# Patient Record
Sex: MALE | Race: WHITE
[De-identification: names, ages, dates, MRNs, and addresses within clinical notes are randomized per-mention and may not be internally consistent; named-entity substitution may affect disease eponyms.]

---

## 2021-01-07 ENCOUNTER — HOSPITAL ENCOUNTER (OUTPATIENT)
Dept: HOSPITAL 35 - LAB | Age: 76
End: 2021-01-07
Attending: NURSE PRACTITIONER
Payer: COMMERCIAL

## 2021-01-07 DIAGNOSIS — U07.1: Primary | ICD-10-CM

## 2021-01-15 ENCOUNTER — HOSPITAL ENCOUNTER (INPATIENT)
Dept: HOSPITAL 35 - ER | Age: 76
LOS: 4 days | Discharge: HOME HEALTH | DRG: 177 | End: 2021-01-19
Attending: FAMILY MEDICINE | Admitting: FAMILY MEDICINE
Payer: COMMERCIAL

## 2021-01-15 VITALS — HEIGHT: 70 IN | WEIGHT: 226 LBS | BODY MASS INDEX: 32.35 KG/M2

## 2021-01-15 VITALS — DIASTOLIC BLOOD PRESSURE: 47 MMHG | SYSTOLIC BLOOD PRESSURE: 103 MMHG

## 2021-01-15 VITALS — SYSTOLIC BLOOD PRESSURE: 113 MMHG | DIASTOLIC BLOOD PRESSURE: 58 MMHG

## 2021-01-15 DIAGNOSIS — F41.9: ICD-10-CM

## 2021-01-15 DIAGNOSIS — U07.1: Primary | ICD-10-CM

## 2021-01-15 DIAGNOSIS — G89.29: ICD-10-CM

## 2021-01-15 DIAGNOSIS — Z90.49: ICD-10-CM

## 2021-01-15 DIAGNOSIS — K59.00: ICD-10-CM

## 2021-01-15 DIAGNOSIS — J12.82: ICD-10-CM

## 2021-01-15 DIAGNOSIS — J96.01: ICD-10-CM

## 2021-01-15 DIAGNOSIS — M54.9: ICD-10-CM

## 2021-01-15 DIAGNOSIS — Z79.899: ICD-10-CM

## 2021-01-15 DIAGNOSIS — F32.9: ICD-10-CM

## 2021-01-15 LAB
ALBUMIN SERPL-MCNC: 2.6 G/DL (ref 3.4–5)
ALT SERPL-CCNC: 30 U/L (ref 16–63)
ANION GAP SERPL CALC-SCNC: 10 MMOL/L (ref 7–16)
AST SERPL-CCNC: 30 U/L (ref 15–37)
BASOPHILS NFR BLD AUTO: 0.7 % (ref 0–2)
BILIRUB DIRECT SERPL-MCNC: 0.3 MG/DL
BILIRUB SERPL-MCNC: 1 MG/DL (ref 0.2–1)
BUN SERPL-MCNC: 14 MG/DL (ref 7–18)
CALCIUM SERPL-MCNC: 8.8 MG/DL (ref 8.5–10.1)
CHLORIDE SERPL-SCNC: 100 MMOL/L (ref 98–107)
CO2 SERPL-SCNC: 26 MMOL/L (ref 21–32)
CREAT SERPL-MCNC: 1.4 MG/DL (ref 0.7–1.3)
EOSINOPHIL NFR BLD: 1 % (ref 0–3)
ERYTHROCYTE [DISTWIDTH] IN BLOOD BY AUTOMATED COUNT: 13.6 % (ref 10.5–14.5)
GLUCOSE SERPL-MCNC: 111 MG/DL (ref 74–106)
GRANULOCYTES NFR BLD MANUAL: 75.6 % (ref 36–66)
HCT VFR BLD CALC: 40.1 % (ref 42–52)
HGB BLD-MCNC: 13.5 GM/DL (ref 14–18)
LYMPHOCYTES NFR BLD AUTO: 12.3 % (ref 24–44)
MCH RBC QN AUTO: 29 PG (ref 26–34)
MCHC RBC AUTO-ENTMCNC: 33.7 G/DL (ref 28–37)
MCV RBC: 86.2 FL (ref 80–100)
MONOCYTES NFR BLD: 10.4 % (ref 1–8)
NEUTROPHILS # BLD: 6.8 THOU/UL (ref 1.4–8.2)
PLATELET # BLD: 373 THOU/UL (ref 150–400)
POTASSIUM SERPL-SCNC: 3.4 MMOL/L (ref 3.5–5.1)
PROT SERPL-MCNC: 7.2 G/DL (ref 6.4–8.2)
RBC # BLD AUTO: 4.65 MIL/UL (ref 4.5–6)
SODIUM SERPL-SCNC: 136 MMOL/L (ref 136–145)
WBC # BLD AUTO: 9 THOU/UL (ref 4–11)

## 2021-01-15 PROCEDURE — 10879: CPT

## 2021-01-15 PROCEDURE — XW033E5 INTRODUCTION OF REMDESIVIR ANTI-INFECTIVE INTO PERIPHERAL VEIN, PERCUTANEOUS APPROACH, NEW TECHNOLOGY GROUP 5: ICD-10-PCS | Performed by: FAMILY MEDICINE

## 2021-01-15 NOTE — NUR
75 year old male presented to the ED c/o feeling weak and diffuse myalgias
across his back since 1/5/21.  He then got tested for COVID on 1/7/21, which
resulted positive.  He's been monitoring his pulse ox at home, which has been
dipping down to about 89% for the past week.  Today, he noticed it dropping
into 70's.  The patient also notes a fever at 101.0.  The patient has been
admitted to Dr. Quesada on [facility] tele and started in the ED on Zithromax and IV
fluid. Per ED assessment patient is A & O x4.   Patients neida Giraldo has been
notified via ED MD of admission.
 
Enzo Pires [phone] per ED is listed as DPOA.  Noted and called to
Registration to update.  Called and spoke to son and explained role of CM.
Son voiced an understanding.  CM to follow for discharge needs.

## 2021-01-15 NOTE — EKG
[address]  [address]
Phone:  [phone]                    ELECTROCARDIOGRAM REPORT      
_______________________________________________________________________________
 
Name:       ALLEY KEYS             Room #:                     [facility]  
M.RaSulo#:      [id number]     Account #:      [id number]  
Admission:  01/15/21    Attend Phys:                          
Discharge:              Date of Birth:  [birth year]  
                                                          Report #: [id number]
   [id number]
_______________________________________________________________________________
                         [facility] ED
                                       
Test Date:    2021-01-15               Test Time:    12:30:11
Pat Name:     ALLEY KEYS          Department:   
Patient ID:   [id number]            Room:          
Gender:       M                        Technician:   kf
:          1945               Requested By: Chery Billy
Order Number: [id number] MD:   Lyndon Nicole
                                 Measurements
Intervals                              Axis          
Rate:         67                       P:            16
VA:           180                      QRS:          18
QRSD:         119                      T:            23
QT:           427                                    
QTc:          451                                    
                           Interpretive Statements
Sinus rhythm
Nonspecific intraventricular conduction delay
Baseline wander in lead(s) I,II,III,aVR,aVL,aVF,V1,V2,V3,V4,V5,V6
Compared to ECG 2012 11:19:31
Sinus bradycardia no longer present
Electronically Signed On 1- 13:03:32 CST by Lyndon Nicole
[email or web address]
 
 
 
 
 
 
 
 
 
 
 
 
 
 
 
 
 
 
 
 
  <ELECTRONICALLY SIGNED>
   By: Lyndon Nicole MD, FACC    
  01/15/21     1303
D: 01/15/21 1230                           _____________________________________
T: 01/15/21 1230                           Lyndon Nicole MD, FAC      /EPI

## 2021-01-16 VITALS — DIASTOLIC BLOOD PRESSURE: 47 MMHG | SYSTOLIC BLOOD PRESSURE: 93 MMHG

## 2021-01-16 VITALS — SYSTOLIC BLOOD PRESSURE: 104 MMHG | DIASTOLIC BLOOD PRESSURE: 63 MMHG

## 2021-01-16 LAB
ALBUMIN SERPL-MCNC: 2.2 G/DL (ref 3.4–5)
ALT SERPL-CCNC: 31 U/L (ref 30–65)
ANION GAP SERPL CALC-SCNC: 11 MMOL/L (ref 7–16)
AST SERPL-CCNC: 27 U/L (ref 15–37)
BILIRUB SERPL-MCNC: 0.4 MG/DL (ref 0.2–1)
BUN SERPL-MCNC: 17 MG/DL (ref 7–18)
CALCIUM SERPL-MCNC: 8.4 MG/DL (ref 8.5–10.1)
CHLORIDE SERPL-SCNC: 107 MMOL/L (ref 98–107)
CO2 SERPL-SCNC: 23 MMOL/L (ref 21–32)
CREAT SERPL-MCNC: 1.1 MG/DL (ref 0.7–1.3)
ERYTHROCYTE [DISTWIDTH] IN BLOOD BY AUTOMATED COUNT: 13.4 % (ref 10.5–14.5)
GLUCOSE SERPL-MCNC: 190 MG/DL (ref 74–106)
HCT VFR BLD CALC: 39.3 % (ref 42–52)
HGB BLD-MCNC: 13 GM/DL (ref 14–18)
MCH RBC QN AUTO: 28.7 PG (ref 26–34)
MCHC RBC AUTO-ENTMCNC: 33 G/DL (ref 28–37)
MCV RBC: 87 FL (ref 80–100)
PLATELET # BLD: 344 THOU/UL (ref 150–400)
POTASSIUM SERPL-SCNC: 4.2 MMOL/L (ref 3.5–5.1)
PROT SERPL-MCNC: 6.8 G/DL (ref 6.4–8.2)
RBC # BLD AUTO: 4.51 MIL/UL (ref 4.5–6)
SODIUM SERPL-SCNC: 141 MMOL/L (ref 136–145)
WBC # BLD AUTO: 4.8 THOU/UL (ref 4–11)

## 2021-01-16 NOTE — NUR
SECOND ATTEMPT AT REPORT. BEING TOLD THAT NURSECHIO IS STILL IN PATIENT
ROOM, CHARGE NURSE UNAVAILABLE TO TAKE REPORT. NURSING SUPERVISOR NOTIFIED

## 2021-01-17 VITALS — DIASTOLIC BLOOD PRESSURE: 71 MMHG | SYSTOLIC BLOOD PRESSURE: 112 MMHG

## 2021-01-17 VITALS — SYSTOLIC BLOOD PRESSURE: 98 MMHG | DIASTOLIC BLOOD PRESSURE: 63 MMHG

## 2021-01-17 VITALS — SYSTOLIC BLOOD PRESSURE: 96 MMHG | DIASTOLIC BLOOD PRESSURE: 60 MMHG

## 2021-01-17 VITALS — DIASTOLIC BLOOD PRESSURE: 66 MMHG | SYSTOLIC BLOOD PRESSURE: 116 MMHG

## 2021-01-17 VITALS — SYSTOLIC BLOOD PRESSURE: 106 MMHG | DIASTOLIC BLOOD PRESSURE: 70 MMHG

## 2021-01-17 VITALS — DIASTOLIC BLOOD PRESSURE: 58 MMHG | SYSTOLIC BLOOD PRESSURE: 102 MMHG

## 2021-01-17 LAB
ALBUMIN SERPL-MCNC: 2 G/DL (ref 3.4–5)
ALT SERPL-CCNC: 27 U/L (ref 30–65)
ANION GAP SERPL CALC-SCNC: 11 MMOL/L (ref 7–16)
AST SERPL-CCNC: 26 U/L (ref 15–37)
BILIRUB DIRECT SERPL-MCNC: 0.1 MG/DL
BILIRUB SERPL-MCNC: 0.3 MG/DL (ref 0.2–1)
BUN SERPL-MCNC: 19 MG/DL (ref 7–18)
CALCIUM SERPL-MCNC: 8.1 MG/DL (ref 8.5–10.1)
CHLORIDE SERPL-SCNC: 110 MMOL/L (ref 98–107)
CO2 SERPL-SCNC: 23 MMOL/L (ref 21–32)
CREAT SERPL-MCNC: 1.1 MG/DL (ref 0.7–1.3)
ERYTHROCYTE [DISTWIDTH] IN BLOOD BY AUTOMATED COUNT: 13.4 % (ref 10.5–14.5)
GLUCOSE SERPL-MCNC: 193 MG/DL (ref 74–106)
HCT VFR BLD CALC: 36.7 % (ref 42–52)
HGB BLD-MCNC: 12.1 GM/DL (ref 14–18)
MCH RBC QN AUTO: 28.7 PG (ref 26–34)
MCHC RBC AUTO-ENTMCNC: 33.1 G/DL (ref 28–37)
MCV RBC: 86.8 FL (ref 80–100)
PHOSPHATE SERPL-MCNC: 2.5 MG/DL (ref 2.5–4.9)
PLATELET # BLD: 400 THOU/UL (ref 150–400)
POTASSIUM SERPL-SCNC: 4.3 MMOL/L (ref 3.5–5.1)
PROT SERPL-MCNC: 6.1 G/DL (ref 6.4–8.2)
RBC # BLD AUTO: 4.23 MIL/UL (ref 4.5–6)
SODIUM SERPL-SCNC: 144 MMOL/L (ref 136–145)
WBC # BLD AUTO: 9.3 THOU/UL (ref 4–11)

## 2021-01-17 NOTE — NUR
PT PROGRESSING TOWARDS D/C GOALS. VSS AFEBRILE. SATS 94% ON 1LNC. SCHEDULED
MORPHINE GIVEN FOR HAND PAIN. PT IS RESTING QUIETLY PRESENTLY. NO S/S
DISTRESS. BED DOWN . CALL LIGHT IN REACH. BED ALARM IS ON. WILL CONTINUE TO
MONITOR PT FOR CHANGES.

## 2021-01-17 NOTE — NUR
RN ASSUMED PT'S CARE AT 0700AM, PT IS A&OX3, PT IS ON O2 2L/MIN/NC, PT'S VS
AND O2SAT ARE STABLE, PT IS CONTINUING IV ABX , PT'S SOB HAS IMPROVED, PT IS
ON COVID ISOLATION, PT CAN GET UP CHAIR AND BATH ROOM BY HIMSELF,

## 2021-01-17 NOTE — NUR
ADMIT
 
PT ADMITTED TO ROOM 349 FROM ED BEING ADMITTED WITH COVID AND HYPOXIA. PT WAS
DIAGNOSED WITH COVID 1/7/21 AND HAS BEEN
PROGESSIVELY GETTING WORSE AT HOME O2 SATS
DROPPED TO 70'S. CRACKLES HEARD THROUGOUT ALL LUNG FIELDS PT HAS A DRY COUGH.
RT TX'S INITIATED PT WEARING 2 LITERS OF O2 SATS 93 TO 96%. IV TO RAC INFUSING
NS@80CC/HR REMDESIVIR AND ASCORBIC ACID GIVEN IV AS ORDERED PT TOLERATED WITH
NO DIFFICULTY. SKIN C/D/I NO AREAS OF BREAKDOWN NOTED. CONTINUE POC.

## 2021-01-18 VITALS — DIASTOLIC BLOOD PRESSURE: 63 MMHG | SYSTOLIC BLOOD PRESSURE: 98 MMHG

## 2021-01-18 VITALS — DIASTOLIC BLOOD PRESSURE: 71 MMHG | SYSTOLIC BLOOD PRESSURE: 104 MMHG

## 2021-01-18 VITALS — SYSTOLIC BLOOD PRESSURE: 104 MMHG | DIASTOLIC BLOOD PRESSURE: 67 MMHG

## 2021-01-18 VITALS — DIASTOLIC BLOOD PRESSURE: 78 MMHG | SYSTOLIC BLOOD PRESSURE: 123 MMHG

## 2021-01-18 VITALS — SYSTOLIC BLOOD PRESSURE: 117 MMHG | DIASTOLIC BLOOD PRESSURE: 63 MMHG

## 2021-01-18 VITALS — DIASTOLIC BLOOD PRESSURE: 54 MMHG | SYSTOLIC BLOOD PRESSURE: 111 MMHG

## 2021-01-18 LAB
ALBUMIN SERPL-MCNC: 2.1 G/DL (ref 3.4–5)
ALT SERPL-CCNC: 33 U/L (ref 16–63)
ANION GAP SERPL CALC-SCNC: 10 MMOL/L (ref 7–16)
AST SERPL-CCNC: 31 U/L (ref 15–37)
BILIRUB DIRECT SERPL-MCNC: 0.1 MG/DL
BILIRUB SERPL-MCNC: 0.3 MG/DL (ref 0.2–1)
BUN SERPL-MCNC: 22 MG/DL (ref 7–18)
CALCIUM SERPL-MCNC: 8.2 MG/DL (ref 8.5–10.1)
CHLORIDE SERPL-SCNC: 108 MMOL/L (ref 98–107)
CO2 SERPL-SCNC: 25 MMOL/L (ref 21–32)
CREAT SERPL-MCNC: 1.1 MG/DL (ref 0.7–1.3)
GLUCOSE SERPL-MCNC: 206 MG/DL (ref 74–106)
PHOSPHATE SERPL-MCNC: 3.6 MG/DL (ref 2.6–4.7)
POTASSIUM SERPL-SCNC: 4.2 MMOL/L (ref 3.5–5.1)
PROT SERPL-MCNC: 5.9 G/DL (ref 6.4–8.2)
SODIUM SERPL-SCNC: 143 MMOL/L (ref 136–145)

## 2021-01-18 NOTE — NUR
INITIAL ASSESSMENT:
SW reviewed chart and spoke with nursing. Pt was admitted from home due to
hypoxia. Pt placed in Enhanced Isolation due to COVID-19. Pt had first
positive test on 1/7. Pt is afebrile and is on 1L of O2. Pt is on IV abx, IV
steroids and IV lasix. Pt is completing Remdesivir and Ivermectin. Discharge
home with possible home O2 is anticipated for tomorrow. SW placed call to pt's
room. No answer. SW left voice message for pt's son/DPOA, Enzo, to discuss
discharge plan. Will need rest/exercise oximetry to qualify for home O2. SW is
following to assist as needed with discharge planning.

## 2021-01-18 NOTE — NUR
Pt progressing well towards d/c goals. VSS afebrile sat 92% on 1LNC. lungs
sound diminshed at bases. Unlabored on 1lnc. Encouraged pt o sit up and walk
around room during day shift .

## 2021-01-18 NOTE — NUR
RN ASSUMED PT'S CARE AT 0700AM, PT IS A&OX3, PT IS CONTINUING IV ABX  , PT IS
O2 1L/MIN/NC, PT 'S VS ARE STABLE, PT DENIES SOB TODAY, PT GETS UP TO BATH
ROOM BY HIMSELF,

## 2021-01-19 VITALS — DIASTOLIC BLOOD PRESSURE: 59 MMHG | SYSTOLIC BLOOD PRESSURE: 101 MMHG

## 2021-01-19 VITALS — SYSTOLIC BLOOD PRESSURE: 110 MMHG | DIASTOLIC BLOOD PRESSURE: 56 MMHG

## 2021-01-19 VITALS — SYSTOLIC BLOOD PRESSURE: 116 MMHG | DIASTOLIC BLOOD PRESSURE: 73 MMHG

## 2021-01-19 LAB
ALBUMIN SERPL-MCNC: 2.1 G/DL (ref 3.4–5)
ALT SERPL-CCNC: 54 U/L (ref 30–65)
ANION GAP SERPL CALC-SCNC: 8 MMOL/L (ref 7–16)
AST SERPL-CCNC: 43 U/L (ref 15–37)
BILIRUB DIRECT SERPL-MCNC: 0.2 MG/DL
BILIRUB SERPL-MCNC: 0.3 MG/DL (ref 0.2–1)
BUN SERPL-MCNC: 26 MG/DL (ref 7–18)
CALCIUM SERPL-MCNC: 7.5 MG/DL (ref 8.5–10.1)
CHLORIDE SERPL-SCNC: 107 MMOL/L (ref 98–107)
CO2 SERPL-SCNC: 28 MMOL/L (ref 21–32)
CREAT SERPL-MCNC: 1.2 MG/DL (ref 0.7–1.3)
GLUCOSE SERPL-MCNC: 207 MG/DL (ref 74–106)
PHOSPHATE SERPL-MCNC: 3.6 MG/DL (ref 2.5–4.9)
POTASSIUM SERPL-SCNC: 4.1 MMOL/L (ref 3.5–5.1)
PROT SERPL-MCNC: 5.3 G/DL (ref 6.4–8.2)
SODIUM SERPL-SCNC: 143 MMOL/L (ref 136–145)

## 2021-01-19 NOTE — NUR
DISCHARGE NOTE:
CLAUDE reviewed chart and spoke with nursing and attending physician. Pt is
medically stable for discharge home today. Orders written for HH and possible
home O2. Rest/exercise oximetry ordered this morning to determine home O2
needs. CLAUDE placed call to pt's room. No answer. CLAUDE spoke with pt's son, Ayaan,
via phone to provide update and discuss discharge plan. CLAUDE provided options
for HH/DME companies. No preference voiced. CLAUDE confirmed pt's home address and
phone number. Pt's family will be able to provide transportation home. CLAUDE
faxed HH referral to [facility] and home O2 referral to [facility]. Notified
liaisons of new referrals. Awaiting confirmation of acceptance and
rest/exercise oximetry results to send to [facility] for home O2. CLAUDE is following
to finalize discharge plans.

## 2021-01-19 NOTE — NUR
Pt. stated he slept well during the night. O2 at 2L/NC with O2 sat in the low
90's. Scheduled pain med given for chronic hand pain (neuropathy per pt.).
Cont. on enhanced precaution , afebrile. Voiding per urinal. Up ad dave in room
with steady gait. Making progress towards care plan goals.